# Patient Record
Sex: MALE | Race: WHITE
[De-identification: names, ages, dates, MRNs, and addresses within clinical notes are randomized per-mention and may not be internally consistent; named-entity substitution may affect disease eponyms.]

---

## 2018-01-30 ENCOUNTER — HOSPITAL ENCOUNTER (EMERGENCY)
Dept: HOSPITAL 72 - EMR | Age: 28
Discharge: HOME | End: 2018-01-30
Payer: COMMERCIAL

## 2018-01-30 VITALS — SYSTOLIC BLOOD PRESSURE: 137 MMHG | DIASTOLIC BLOOD PRESSURE: 82 MMHG

## 2018-01-30 VITALS — WEIGHT: 160 LBS | BODY MASS INDEX: 21.2 KG/M2 | HEIGHT: 73 IN

## 2018-01-30 DIAGNOSIS — F41.9: ICD-10-CM

## 2018-01-30 DIAGNOSIS — I10: ICD-10-CM

## 2018-01-30 DIAGNOSIS — G40.409: Primary | ICD-10-CM

## 2018-01-30 DIAGNOSIS — F15.10: ICD-10-CM

## 2018-01-30 DIAGNOSIS — Z88.2: ICD-10-CM

## 2018-01-30 LAB
ADD MANUAL DIFF: NO
ANION GAP SERPL CALC-SCNC: 19 MMOL/L (ref 5–15)
BASOPHILS NFR BLD AUTO: 1.2 % (ref 0–2)
BUN SERPL-MCNC: 18 MG/DL (ref 7–18)
CALCIUM SERPL-MCNC: 10.1 MG/DL (ref 8.5–10.1)
CHLORIDE SERPL-SCNC: 96 MMOL/L (ref 98–107)
CO2 SERPL-SCNC: 21 MMOL/L (ref 21–32)
CREAT SERPL-MCNC: 1.3 MG/DL (ref 0.55–1.3)
EOSINOPHIL NFR BLD AUTO: 1.5 % (ref 0–3)
ERYTHROCYTE [DISTWIDTH] IN BLOOD BY AUTOMATED COUNT: 11.2 % (ref 11.6–14.8)
HCT VFR BLD CALC: 43.6 % (ref 42–52)
HGB BLD-MCNC: 14.7 G/DL (ref 14.2–18)
LYMPHOCYTES NFR BLD AUTO: 24.8 % (ref 20–45)
MCV RBC AUTO: 95 FL (ref 80–99)
MONOCYTES NFR BLD AUTO: 13.1 % (ref 1–10)
NEUTROPHILS NFR BLD AUTO: 59.3 % (ref 45–75)
PLATELET # BLD: 217 K/UL (ref 150–450)
POTASSIUM SERPL-SCNC: 3.6 MMOL/L (ref 3.5–5.1)
RBC # BLD AUTO: 4.57 M/UL (ref 4.7–6.1)
SODIUM SERPL-SCNC: 136 MMOL/L (ref 136–145)
WBC # BLD AUTO: 6.8 K/UL (ref 4.8–10.8)

## 2018-01-30 PROCEDURE — 99284 EMERGENCY DEPT VISIT MOD MDM: CPT

## 2018-01-30 PROCEDURE — 80048 BASIC METABOLIC PNL TOTAL CA: CPT

## 2018-01-30 PROCEDURE — 36415 COLL VENOUS BLD VENIPUNCTURE: CPT

## 2018-01-30 PROCEDURE — 96361 HYDRATE IV INFUSION ADD-ON: CPT

## 2018-01-30 PROCEDURE — 80307 DRUG TEST PRSMV CHEM ANLYZR: CPT

## 2018-01-30 PROCEDURE — 96365 THER/PROPH/DIAG IV INF INIT: CPT

## 2018-01-30 PROCEDURE — 85025 COMPLETE CBC W/AUTO DIFF WBC: CPT

## 2018-01-30 NOTE — EMERGENCY ROOM REPORT
History of Present Illness


General


Chief Complaint:  Seizure


Source:  Patient, EMS





Present Illness


HPI


This is a 27-year-old male with one episode of seizure 2 years ago.  He is not 

on medication.  Currently he is taking BuSpar and Ativan for anxiety.  He 

presents with chief complaint of a seizure activity tonight.  He was a 

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  His been witnessed it.  

Lasted about a minute.  No tongue laceration.  No incontinence of bowel or 

urine.  Initially he was postictal but back to baseline now.  He said that he 

hasn't had any sleep in the last 48 hours.  He said he went without she for 24 

hours and then just worked a 12 shift without lunch.


Allergies:  


Coded Allergies:  


     SULFA (SULFONAMIDE ANTIBIOTICS) (Verified  Allergy, Unknown, 1/30/18)





Patient History


Past Medical History:  see triage record, old chart reviewed


Past Surgical History:  none


Pertinent Family History:  none


Social History:  Denies: smoking


Immunizations:  other


Reviewed Nursing Documentation:  PMH: Agreed, PSxH: Agreed





Nursing Documentation-PMH


Hx Hypertension:  Yes


History Of Psychiatric Problem:  Yes - ANXIETY


Hx Seizures:  Yes





Review of Systems


Eye:  Denies: eye pain, blurred vision


ENT:  Denies: ear pain, nose congestion, throat swelling


Respiratory:  Denies: cough, shortness of breath


Cardiovascular:  Denies: chest pain, palpitations


Gastrointestinal:  Denies: abdominal pain, diarrhea, nausea, vomiting


Musculoskeletal:  Denies: back pain, joint pain


Skin:  Denies: rash


Neurological:  Denies: headache, numbness


Endocrine:  Denies: increased thirst, increased urine


Hematologic/Lymphatic:  Denies: easy bruising


All Other Systems:  negative except mentioned in HPI





Physical Exam





Vital Signs








  Date Time  Temp Pulse Resp B/P (MAP) Pulse Ox O2 Delivery O2 Flow Rate FiO2


 


1/30/18 01:13 99.7 141 18 178/92 98 Room Air  





vitals with tachycardia and htn


Sp02 EP Interpretation:  reviewed, normal


General Appearance:  well appearing, no apparent distress, alert


Head:  normocephalic, atraumatic


Eyes:  bilateral eye PERRL, bilateral eye EOMI


ENT:  hearing grossly normal, normal pharynx


Neck:  full range of motion, supple, no meningismus


Respiratory:  chest non-tender, lungs clear, normal breath sounds


Cardiovascular #1:  regular rate, rhythm, no murmur


Gastrointestinal:  normal bowel sounds, non tender, no mass, no organomegaly, 

no bruit, non-distended


Musculoskeletal:  back normal, gait/station normal, normal range of motion


Psychiatric:  mood/affect normal


Skin:  warm/dry





Medical Decision Making


Diagnostic Impression:  


 Primary Impression:  


 Epileptic seizure, generalized


 Additional Impression:  


 Amphetamine abuse


ER Course


Patient presents with a tonic-clonic seizure activity.  Most likely secondary 

to sleep deprivation.  He is improved now.  Heart rate improved.  Blood 

pressure better.  I will put him on Keppra.  Because of his seizure activity, 

restrict his driving privileges.  He would need to be clear by a neurologist.  

Labs and UDS pending.  No evidence of TIA or CVA.  Notice any bleeding.


Lab Results Impression


labs unremarkable.


Rhythm Strip Diag. Results


Rhythm Strip Time:  02:48


EP Interpretation:  yes


Rate:  115


Rhythm:  NSR, no PVC's, no ectopy





Last Vital Signs








  Date Time  Temp Pulse Resp B/P (MAP) Pulse Ox O2 Delivery O2 Flow Rate FiO2


 


1/30/18 01:13 99.7 141 18 178/92 98 Room Air  








Status:  improved


Disposition:  HOME, SELF-CARE


Condition:  Stable


Scripts


Levetiracetam (KEPPRA) 500 Mg Tablet


500 MG ORAL EVERY 12 HOURS, #60 TAB 0 Refills


   Prov: JOAO CHIN M.D.         1/30/18


Patient Instructions:  Seizure, Adult





Additional Instructions:  


Followup your DrSherrie in 2-3 days.  You cannot drive because you had a seizure.  

You would need medical clearance from a neurologist.  Return if symptom worsen.











JOAO CHIN M.D. Jan 30, 2018 01:29